# Patient Record
Sex: MALE | Race: WHITE | NOT HISPANIC OR LATINO | Employment: STUDENT | URBAN - NONMETROPOLITAN AREA
[De-identification: names, ages, dates, MRNs, and addresses within clinical notes are randomized per-mention and may not be internally consistent; named-entity substitution may affect disease eponyms.]

---
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
## 2025-02-18 PROCEDURE — RXMED WILLOW AMBULATORY MEDICATION CHARGE

## 2025-02-19 ENCOUNTER — PHARMACY VISIT (OUTPATIENT)
Dept: PHARMACY | Facility: CLINIC | Age: 20
End: 2025-02-19
Payer: COMMERCIAL

## 2025-03-11 ENCOUNTER — OFFICE VISIT (OUTPATIENT)
Dept: OTOLARYNGOLOGY | Facility: CLINIC | Age: 20
End: 2025-03-11
Payer: COMMERCIAL

## 2025-03-11 VITALS — WEIGHT: 142 LBS | HEIGHT: 70 IN | TEMPERATURE: 97.3 F | BODY MASS INDEX: 20.33 KG/M2

## 2025-03-11 DIAGNOSIS — R59.0 LOCALIZED ENLARGED LYMPH NODES: Primary | ICD-10-CM

## 2025-03-11 PROCEDURE — 99024 POSTOP FOLLOW-UP VISIT: CPT | Performed by: OTOLARYNGOLOGY

## 2025-03-11 PROCEDURE — 1036F TOBACCO NON-USER: CPT | Performed by: OTOLARYNGOLOGY

## 2025-03-11 PROCEDURE — 3008F BODY MASS INDEX DOCD: CPT | Performed by: OTOLARYNGOLOGY

## 2025-03-11 NOTE — PROGRESS NOTES
"HPI  Rylan Cuenca is a 19 y.o. male few days of left-sided posterior neck mass, posterior to the posterior border of the sternocleidomastoid.  He has a skin problem in his hairline for which she is using lotion which has been helping.  He has a smaller mass right side neck, anteriorly.  Both are mobile.  No skin changes over the masses.  He has conchal bowl furuncle on the left that is improving.      Past Medical History:   Diagnosis Date    Acute maxillary sinusitis 10/11/2023    Acute upper respiratory infection 10/11/2023    Autoimmune thyroiditis     Autoimmune thyroiditis    Chronic rhinitis     Rhinitis    Contact with and (suspected) exposure to environmental tobacco smoke (acute) (chronic)     Second hand smoke exposure    Encounter for full-term uncomplicated delivery 01/07/2015    FTND (full term normal delivery)    Other conditions influencing health status     Full-term infant    Personal history of other diseases of the respiratory system 10/11/2021    History of sore throat    Personal history of other medical treatment     History of PFTs    Rhinitis 10/11/2023            Medications:     Current Outpatient Medications:     levothyroxine (Synthroid, Levoxyl) 150 mcg tablet, Take 1 tablet (150 mcg) by mouth once daily. Take on an empty stomach at the same time each day, either 30 to 60 minutes prior to breakfast, Disp: 30 tablet, Rfl: 11    ISOtretinoin (Accutane) 30 mg capsule, TAKE ONE PILL BY MOUTH TWICE A DAY WITH A FULL GLASS OF WATER (Patient not taking: Reported on 3/11/2025), Disp: , Rfl:     multivitamin tablet, Take 1 tablet by mouth once daily. (Patient not taking: Reported on 3/11/2025), Disp: , Rfl:      Allergies:  Allergies   Allergen Reactions    Chocolate Shortness of breath    Milk Shortness of breath    Peanut Swelling    Pollen Extracts Itching    Cefdinir Itching and Rash        Physical Exam:  Last Recorded Vitals  Temperature 36.3 °C (97.3 °F), height 1.778 m (5' 10\"), weight " 64.4 kg (142 lb).  General:     General appearance: Well-developed, well-nourished in no acute distress.       Voice:  normal       Head/face: Normal appearance; nontender to palpation     Facial nerve function: Normal and symmetric bilaterally.    Oral/oropharynx:     Oral vestibule: Normal labial and gingival mucosa     Tongue/floor of mouth: Normal without lesion     Oropharynx: Clear.  No lesions present of the hard/soft palate, posterior pharynx    Neck:     Neck: Normal appearance, trachea midline     Salivary glands: Normal to palpation bilaterally     Lymph nodes: Subcentimeter lymph node on the right which is mobile.  Left side with a slightly larger node in the area of concern posterior border of the sternocleidomastoid.  Appears in a location that is reacting to skin issues of the hairline and conchal bowl     Thyroid: No thyromegaly.  No palpable nodules     Range of motion: Normal    Neurological:     Cortical functions: Alert and oriented x3, appropriate affect       Larynx/hypopharynx:     Laryngeal findings: Mirror exam inadequate or limited secondary to enlarged base of tongue and/or excessive gagging    Ear:     Ear canal: Normal bilaterally     Tympanic membrane: Intact and mobile bilaterally     Pinna: Normal bilaterally     Hearing:  Gross hearing assessment normal by voice    Nose:     Visualized using: Anterior rhinoscopy     Nasopharynx: Inadequate mirror exam secondary to gag, anatomy.       Nasal dorsum: Nontraumatic midline appearance     Septum: Midline     Inferior turbinates: Normally sized     Mucosa: Bilateral, pink, normal appearing       ASSESSMENT/PLAN:  Reactive cervical lymphadenopathy with explanation in the skin.  Recommend monitoring and may consider oral antibiotics if enlarges but expect resolution over the next week or so.  Reassured.  Recheck as needed        Kurt Pickard MD

## 2025-04-04 ENCOUNTER — OFFICE VISIT (OUTPATIENT)
Dept: URGENT CARE | Age: 20
End: 2025-04-04
Payer: COMMERCIAL

## 2025-04-04 VITALS
BODY MASS INDEX: 20.33 KG/M2 | TEMPERATURE: 97.8 F | RESPIRATION RATE: 16 BRPM | SYSTOLIC BLOOD PRESSURE: 110 MMHG | HEIGHT: 70 IN | DIASTOLIC BLOOD PRESSURE: 76 MMHG | HEART RATE: 113 BPM | OXYGEN SATURATION: 97 % | WEIGHT: 142 LBS

## 2025-04-04 DIAGNOSIS — J01.00 ACUTE NON-RECURRENT MAXILLARY SINUSITIS: Primary | ICD-10-CM

## 2025-04-04 DIAGNOSIS — H66.91 RIGHT OTITIS MEDIA, UNSPECIFIED OTITIS MEDIA TYPE: ICD-10-CM

## 2025-04-04 PROCEDURE — 1036F TOBACCO NON-USER: CPT | Performed by: REGISTERED NURSE

## 2025-04-04 PROCEDURE — 99203 OFFICE O/P NEW LOW 30 MIN: CPT | Performed by: REGISTERED NURSE

## 2025-04-04 PROCEDURE — 3008F BODY MASS INDEX DOCD: CPT | Performed by: REGISTERED NURSE

## 2025-04-04 RX ORDER — AZITHROMYCIN 250 MG/1
TABLET, FILM COATED ORAL
Qty: 6 TABLET | Refills: 0 | Status: SHIPPED | OUTPATIENT
Start: 2025-04-04

## 2025-04-04 ASSESSMENT — ENCOUNTER SYMPTOMS
RHINORRHEA: 1
SORE THROAT: 1
COUGH: 1
SWOLLEN GLANDS: 1
CHILLS: 0
TROUBLE SWALLOWING: 1
FEVER: 0
FATIGUE: 1
SINUS PRESSURE: 1
SINUS PAIN: 1

## 2025-04-04 NOTE — PROGRESS NOTES
Subjective   Patient ID: Rylan Cuenca is a 19 y.o. male. They present today with a chief complaint of Nasal Congestion (Since Tuesday patient has had nasal congestion with green phlegm with slight body aches ) and Sore Throat.    History of Present Illness    History provided by:  Patient  Sore Throat   This is a new problem. The current episode started in the past 7 days. The problem has been unchanged. There has been no fever. The pain is at a severity of 5/10. The pain is moderate. Associated symptoms include congestion, coughing, a plugged ear sensation, swollen glands and trouble swallowing.       Past Medical History  Allergies as of 04/04/2025 - Reviewed 03/11/2025   Allergen Reaction Noted    Pollen extracts Itching 02/13/2010    Cefdinir Itching and Rash 10/11/2023       (Not in a hospital admission)       Past Medical History:   Diagnosis Date    Acute maxillary sinusitis 10/11/2023    Acute upper respiratory infection 10/11/2023    Autoimmune thyroiditis     Autoimmune thyroiditis    Chronic rhinitis     Rhinitis    Contact with and (suspected) exposure to environmental tobacco smoke (acute) (chronic)     Second hand smoke exposure    Encounter for full-term uncomplicated delivery 01/07/2015    FTND (full term normal delivery)    Other conditions influencing health status     Full-term infant    Personal history of other diseases of the respiratory system 10/11/2021    History of sore throat    Personal history of other medical treatment     History of PFTs    Rhinitis 10/11/2023       Past Surgical History:   Procedure Laterality Date    HERNIA REPAIR  01/07/2015    Inguinal Hernia Repair    OTHER SURGICAL HISTORY  01/07/2015    Cystoscopy For Urethral Stricture        reports that he has never smoked. He has never used smokeless tobacco. He reports that he does not drink alcohol and does not use drugs.    Review of Systems  Review of Systems   Constitutional:  Positive for fatigue. Negative for chills  "and fever.   HENT:  Positive for congestion, postnasal drip, rhinorrhea, sinus pressure, sinus pain, sore throat and trouble swallowing.    Respiratory:  Positive for cough.    All other systems reviewed and are negative.                                 Objective    Vitals:    04/04/25 1620   BP: 110/76   BP Location: Left arm   Patient Position: Sitting   BP Cuff Size: Adult   Pulse: (!) 113   Resp: 16   Temp: 36.6 °C (97.8 °F)   TempSrc: Oral   SpO2: 97%   Weight: 64.4 kg (142 lb)   Height: 1.778 m (5' 10\")     No LMP for male patient.    Physical Exam  Vitals and nursing note reviewed.   HENT:      Head: Normocephalic.      Right Ear: Ear canal normal. Tympanic membrane is erythematous.      Left Ear: Ear canal normal.      Nose: Nasal tenderness, mucosal edema, congestion and rhinorrhea present. Rhinorrhea is purulent.      Right Turbinates: Enlarged and swollen.      Left Turbinates: Enlarged and swollen.      Right Sinus: Maxillary sinus tenderness and frontal sinus tenderness present.      Left Sinus: Maxillary sinus tenderness and frontal sinus tenderness present.      Mouth/Throat:      Lips: Pink.      Mouth: Mucous membranes are moist.      Pharynx: Oropharynx is clear. Uvula midline.   Cardiovascular:      Rate and Rhythm: Normal rate and regular rhythm.      Heart sounds: Normal heart sounds.   Pulmonary:      Effort: Pulmonary effort is normal.      Breath sounds: Normal breath sounds.   Neurological:      General: No focal deficit present.      Mental Status: He is alert and oriented to person, place, and time.         Procedures    Point of Care Test & Imaging Results from this visit  No results found for this visit on 04/04/25.   Imaging  No results found.    Cardiology, Vascular, and Other Imaging  No other imaging results found for the past 2 days      Diagnostic study results (if any) were reviewed by Crystal L Severino, APRN-CNP.    Assessment/Plan   Allergies, medications, history, and " pertinent labs/EKGs/Imaging reviewed by Crystal L Severino, APRN-CNP.     Medical Decision Making  19-year-old male patient presents with chief complaint of sore throat, sinus pain pressure and congestion generalized weakness, and body aches.  He denies any fevers or chills, shortness of breath, nausea vomiting or diarrhea.  ENT assessment is as above and is consistent with acute sinusitis along with right otitis media.  At time of discharge patient was clinically well-appearing and HDS for outpatient management. The patient and/or family was educated regarding diagnosis, supportive care, OTC and Rx medications. The patient and/or family was given the opportunity to ask questions prior to discharge.  They verbalized understanding of my discussion of the plans for treatment, expected course, indications to return to  or seek further evaluation in ED, and the need for timely follow up as directed.   They were provided with a work/school excuse if requested.      Orders and Diagnoses  Diagnoses and all orders for this visit:  Acute non-recurrent maxillary sinusitis  -     azithromycin (Zithromax) 250 mg tablet; Take 2 tabs (500 mg) by mouth today, then take 1 tab daily for 4 days.   Post Nasal Drip:  Claritin or Zyrtec everyday for one week   Humidified air  OTC nasal spray as needed   Mucinex DM  Tylenol/ibuprofen as needed for pain/discomfort      Medical Admin Record      Patient disposition: Home    Electronically signed by Crystal L Severino, APRN-CNP  5:00 PM